# Patient Record
Sex: FEMALE | Race: WHITE | Employment: OTHER | URBAN - METROPOLITAN AREA
[De-identification: names, ages, dates, MRNs, and addresses within clinical notes are randomized per-mention and may not be internally consistent; named-entity substitution may affect disease eponyms.]

---

## 2023-02-09 ENCOUNTER — OUTSIDE SERVICES (OUTPATIENT)
Dept: PRIMARY CARE CLINIC | Age: 73
End: 2023-02-09

## 2023-02-09 DIAGNOSIS — F79 UNSPECIFIED INTELLECTUAL DISABILITIES: ICD-10-CM

## 2023-02-09 DIAGNOSIS — E78.2 MIXED HYPERLIPIDEMIA: ICD-10-CM

## 2023-02-09 DIAGNOSIS — E11.9 TYPE 2 DIABETES MELLITUS WITHOUT COMPLICATION, UNSPECIFIED WHETHER LONG TERM INSULIN USE (HCC): ICD-10-CM

## 2023-02-09 DIAGNOSIS — I10 ESSENTIAL HYPERTENSION, BENIGN: ICD-10-CM

## 2023-02-09 DIAGNOSIS — E53.9 VITAMIN B DEFICIENCY: ICD-10-CM

## 2023-02-09 DIAGNOSIS — G93.41 METABOLIC ENCEPHALOPATHY: Primary | ICD-10-CM

## 2023-02-09 ASSESSMENT — VISUAL ACUITY: OU: 1

## 2023-02-09 NOTE — PROGRESS NOTES
23  Gerrianne Collet : 1950 Sex: female  Age: 67 y.o. Tiara Russell is seen today to establish as a new patient at [facility] skilled nursing facility. Staff reports that she is at [facility] for rehabilitation, following metabolic encephalopathy. Patient's sister was at bedside. Staff notes that she has an intellectual disability and the thought process of a 2year-old. Staff notes that when they were assessing her that she had a large red area over her right breast.  There was 1 intact blister and 1 blistered area where the skin was removed. This appears to have happened, prior to coming to [facility] as some of the edges were dry. She did not report any pain, to the area. Staff denies any combative, disruptive or aggressive behaviors from her. Review of Systems   Unable to perform ROS: Other Intellectual disability    Physical Exam  Vitals and nursing note reviewed. Constitutional:       General: She is awake. She is not in acute distress. Appearance: Normal appearance. She is well-developed. She is obese. She is not ill-appearing, toxic-appearing or diaphoretic. HENT:      Head: Normocephalic and atraumatic. Right Ear: External ear normal. Decreased hearing noted. There is no impacted cerumen. Left Ear: External ear normal. Decreased hearing noted. There is no impacted cerumen. Nose: Nose normal. No congestion or rhinorrhea. Mouth/Throat:      Lips: Pink. No lesions. Mouth: Mucous membranes are moist.      Pharynx: Oropharynx is clear. No oropharyngeal exudate or posterior oropharyngeal erythema. Eyes:      General: Lids are normal. Vision grossly intact. Gaze aligned appropriately. No scleral icterus. Right eye: No discharge. Left eye: No discharge. Extraocular Movements: Extraocular movements intact. Conjunctiva/sclera: Conjunctivae normal.      Right eye: Right conjunctiva is not injected. Left eye: Left conjunctiva is not injected. Pupils: Pupils are equal, round, and reactive to light. Neck:      Thyroid: No thyromegaly. Vascular: No carotid bruit. Trachea: Trachea normal.   Cardiovascular:      Rate and Rhythm: Normal rate and regular rhythm. Pulses: Normal pulses. Heart sounds: S1 normal and S2 normal. Murmur heard. Systolic murmur is present with a grade of 1/6. No friction rub. No gallop. Pulmonary:      Effort: Pulmonary effort is normal. No tachypnea, accessory muscle usage or respiratory distress. Breath sounds: Normal breath sounds and air entry. No stridor. No wheezing, rhonchi or rales. Chest:      Chest wall: No tenderness. Abdominal:      General: Bowel sounds are normal. There is no distension. Palpations: Abdomen is soft. There is no mass. Tenderness: There is no abdominal tenderness. There is no right CVA tenderness, left CVA tenderness, guarding or rebound. Hernia: No hernia is present. Musculoskeletal:         General: No swelling, tenderness, deformity or signs of injury. Normal range of motion. Cervical back: Full passive range of motion without pain, normal range of motion and neck supple. No rigidity. No muscular tenderness. Right lower leg: No edema. Left lower leg: No edema. Lymphadenopathy:      Cervical: No cervical adenopathy. Skin:     General: Skin is warm and dry. Capillary Refill: Capillary refill takes less than 2 seconds. Coloration: Skin is not jaundiced or pale. Findings: No bruising, erythema, lesion or rash. Neurological:      General: No focal deficit present. Mental Status: She is alert. Mental status is at baseline. She is confused. Cranial Nerves: Cranial nerves 2-12 are intact. No cranial nerve deficit. Sensory: Sensation is intact. No sensory deficit. Motor: Weakness present.       Gait: Gait abnormal.      Deep Tendon Reflexes: Reflexes normal.   Psychiatric:         Attention and Perception: Attention normal.         Mood and Affect: Mood normal. Affect is flat. Behavior: Behavior normal. Behavior is cooperative. Assessment and Plan:  Pancho Lindsay was seen today for establish care. Diagnoses and all orders for this visit:    Metabolic encephalopathy    Essential hypertension, benign    Mixed hyperlipidemia    Type 2 diabetes mellitus without complication, unspecified whether long term insulin use ([facility] [address]    Vitamin B deficiency    Unspecified intellectual disabilities      Discussions/Education provided to patients during visit:  [] Discussed the importance to stop smoking. [x] Advised to monitor eating habits. [] Reviewed and discussed Imaging results. [] Reviewed and discussed Lab results. [x] Discussed the importance of drinking plenty of fluids. [] Cut down on Salt, Caffeine, and Sugar. [x] Continue Medications as Discussed. [x] Communicated with patient any concerns, to phone office. Orders written: Silvadene to burn, twice daily, until healed. Return in about 4 weeks (around 3/9/2023) for Review of chronic conditions.       Seen By:  SHRUTHI Connolly NP

## 2023-02-23 ENCOUNTER — OUTSIDE SERVICES (OUTPATIENT)
Dept: PRIMARY CARE CLINIC | Age: 73
End: 2023-02-23
Payer: MEDICAID

## 2023-02-23 DIAGNOSIS — E78.2 MIXED HYPERLIPIDEMIA: ICD-10-CM

## 2023-02-23 DIAGNOSIS — G93.41 METABOLIC ENCEPHALOPATHY: ICD-10-CM

## 2023-02-23 DIAGNOSIS — E53.9 VITAMIN B DEFICIENCY: ICD-10-CM

## 2023-02-23 DIAGNOSIS — L03.116 CELLULITIS OF LEFT LOWER EXTREMITY: Primary | ICD-10-CM

## 2023-02-23 DIAGNOSIS — E11.9 TYPE 2 DIABETES MELLITUS WITHOUT COMPLICATION, UNSPECIFIED WHETHER LONG TERM INSULIN USE (HCC): ICD-10-CM

## 2023-02-23 DIAGNOSIS — F79 UNSPECIFIED INTELLECTUAL DISABILITIES: ICD-10-CM

## 2023-02-23 DIAGNOSIS — I10 ESSENTIAL HYPERTENSION, BENIGN: ICD-10-CM

## 2023-02-23 PROCEDURE — 99309 SBSQ NF CARE MODERATE MDM 30: CPT | Performed by: NURSE PRACTITIONER

## 2023-02-23 NOTE — PROGRESS NOTES
23  Karuna Baker : 1950 Sex: female  Age: 67 y.o. Michael Sheffield is seen today, at the request of staff, for complaints of swelling and redness to her left lower extremity. Staff reports over the past few days they have noticed that her legs have began to swell and that she has redness to her leg. She denies any pain to her leg, but it is visibly reddened. Staff has also noted that her abdomen has been distended and more firm than normal.  She denies any other complaints today. Staff reports that they are not aware of any other issues with her. They deny any combative, disruptive or aggressive behaviors from her. Review of Systems  Unable to perform ROS: Other Intellectual disability    Physical Exam  Vitals and nursing note reviewed. Constitutional:       General: She is awake. She is not in acute distress. Appearance: Normal appearance. She is well-developed. She is obese. She is not ill-appearing, toxic-appearing or diaphoretic. HENT:      Head: Normocephalic and atraumatic. Right Ear: External ear normal. Decreased hearing noted. There is no impacted cerumen. Left Ear: External ear normal. Decreased hearing noted. There is no impacted cerumen. Nose: Nose normal. No congestion or rhinorrhea. Mouth/Throat:      Lips: Pink. No lesions. Mouth: Mucous membranes are moist.      Pharynx: Oropharynx is clear. No oropharyngeal exudate or posterior oropharyngeal erythema. Eyes:      General: Lids are normal. Vision grossly intact. Gaze aligned appropriately. No scleral icterus. Right eye: No discharge. Left eye: No discharge. Extraocular Movements: Extraocular movements intact. Conjunctiva/sclera: Conjunctivae normal.      Right eye: Right conjunctiva is not injected. Left eye: Left conjunctiva is not injected. Pupils: Pupils are equal, round, and reactive to light. Neck:      Thyroid: No thyromegaly.       Vascular: No carotid bruit.      Trachea: Trachea normal.   Cardiovascular:      Rate and Rhythm: Normal rate and regular rhythm. Pulses: Normal pulses. Heart sounds: S1 normal and S2 normal. Murmur heard. Systolic murmur is present with a grade of 1/6. No friction rub. No gallop. Pulmonary:      Effort: Pulmonary effort is normal. No tachypnea, accessory muscle usage or respiratory distress. Breath sounds: Normal breath sounds and air entry. No stridor. No wheezing, rhonchi or rales. Chest:      Chest wall: No tenderness. Abdominal:      General: Bowel sounds are normal.  Abdominal distention noted. Palpations: Abdomen is soft. There is no mass. Tenderness: There is no abdominal tenderness. There is no right CVA tenderness, left CVA tenderness, guarding or rebound. Hernia: No hernia is present. Musculoskeletal:         General: No swelling, tenderness, deformity or signs of injury. Normal range of motion. Cervical back: Full passive range of motion without pain, normal range of motion and neck supple. No rigidity. No muscular tenderness. Right lower leg: +1 pitting edema noted. Left lower leg: +1 pitting edema noted. Lymphadenopathy:      Cervical: No cervical adenopathy. Skin:     General: Skin is warm and dry. Capillary Refill: Capillary refill takes less than 2 seconds. Coloration: Skin is not jaundiced or pale. Findings: Erythema noted to the left lower extremity. No bruising, lesion or rash. Neurological:      General: No focal deficit present. Mental Status: She is alert. Mental status is at baseline. She is confused. Cranial Nerves: Cranial nerves 2-12 are intact. No cranial nerve deficit. Sensory: Sensation is intact. No sensory deficit. Motor: Weakness present.       Gait: Gait abnormal.      Deep Tendon Reflexes: Reflexes normal.   Psychiatric:         Attention and Perception: Attention normal.         Mood and Affect: Mood normal. Affect is flat. Behavior: Behavior normal. Behavior is cooperative. Assessment and Plan:  Ana Laura Fisher was seen today for cellulitis. Diagnoses and all orders for this visit:    Cellulitis of left lower extremity    Unspecified intellectual disabilities    Essential hypertension, benign    Type 2 diabetes mellitus without complication, unspecified whether long term insulin use ([facility])    Mixed hyperlipidemia    Vitamin B deficiency    Metabolic encephalopathy      Discussions/Education provided to patients during visit:  [] Discussed the importance to stop smoking. [x] Advised to monitor eating habits. [] Reviewed and discussed Imaging results. [] Reviewed and discussed Lab results. [x] Discussed the importance of drinking plenty of fluids. [x] Cut down on Salt, Caffeine, and Sugar. [x] Continue Medications as Discussed. [x] Communicated with patient any concerns, to phone office. Orders written: Keflex 500 mg p.o. 3 times daily x10 days, hydrochlorothiazide 25 mg p.o. daily, CBC, BNP and BMP in 1 week    Return in about 1 week (around 3/2/2023) for Reassess condition.       Seen By:  SHRUTHI Gregory NP

## 2024-07-22 ENCOUNTER — OUTSIDE SERVICES (OUTPATIENT)
Dept: FAMILY MEDICINE CLINIC | Age: 74
End: 2024-07-22
Payer: COMMERCIAL

## 2024-07-22 DIAGNOSIS — F79 INTELLECTUAL DISABILITY: ICD-10-CM

## 2024-07-22 DIAGNOSIS — K21.9 GASTROESOPHAGEAL REFLUX DISEASE WITHOUT ESOPHAGITIS: ICD-10-CM

## 2024-07-22 DIAGNOSIS — I10 PRIMARY HYPERTENSION: Primary | ICD-10-CM

## 2024-07-22 DIAGNOSIS — E11.9 TYPE 2 DIABETES MELLITUS WITHOUT COMPLICATION, WITHOUT LONG-TERM CURRENT USE OF INSULIN (HCC): ICD-10-CM

## 2024-07-22 DIAGNOSIS — E78.2 MIXED HYPERLIPIDEMIA: ICD-10-CM

## 2024-07-22 PROCEDURE — 99309 SBSQ NF CARE MODERATE MDM 30: CPT | Performed by: NURSE PRACTITIONER

## 2024-07-22 NOTE — PROGRESS NOTES
7/22/2024    She Singh  1950    This resident is being seen today for a skilled evaluation visit.  She is a resident who has long-term medical conditions including hypertension with hypertensive urgency, intellectual disabilities, diabetes mellitus, hyperlipidemia, essential hypertension, proteinuria, asthma, GERD, low back pain, chronic kidney disease.  She is a 73 y.o. female resident who is being seen today for skilled services which this resident recently presented to the hospital setting in the course of the past month with mental status changes and recommendations had been given for an MRI of the brain and was placed on gentle hydration with magnesium replacement and recommendations to monitor blood sugars accordingly.  She had been diagnosed with acute metabolic encephalopathy with associated hypertensive urgency.  Her blood pressures have been more managed and she appears to be doing well.  She does have underlying disabilities in terms of being mentally challenged but is able to communicate with staff.  She denies any complaints regarding pain, headaches or dizziness, sore throat, chest pain, nausea or vomiting, constipation or diarrhea, fever or chills, falls or syncopal events.            Medications:  Amlodipine 10 mg daily  Ipratropium albuterol every 6 hours as needed  Tylenol Extra Strength 1000 mg twice daily  Landen 128 ophthalmic ointment 5% to the left eye daily  Furosemide 20 mg daily  Omeprazole 40 mg daily  Magnesium oxide 400 mg daily  Atorvastatin 40 mg daily  Coreg 12.5 mg twice daily  Flovent  mcg per ACT 2 puffs twice daily  Loratadine 10 mg daily as needed  Glipizide 5 mg daily  Potassium chloride ER 20 mill equivalents daily  Mirtazapine 30 mg daily  Ibuprofen 400 mg 3 times daily  Aspirin 81 mg daily  B12 1000 mcg IM monthly  Diclofenac sodium gel to the bilateral knees 3 times daily.            Objective     Vital Signs: /64 pulse 79 respirations 18 temperature 98

## 2024-07-31 ENCOUNTER — OUTSIDE SERVICES (OUTPATIENT)
Dept: FAMILY MEDICINE CLINIC | Age: 74
End: 2024-07-31
Payer: COMMERCIAL

## 2024-07-31 DIAGNOSIS — M54.50 CHRONIC MIDLINE LOW BACK PAIN WITHOUT SCIATICA: ICD-10-CM

## 2024-07-31 DIAGNOSIS — I10 PRIMARY HYPERTENSION: ICD-10-CM

## 2024-07-31 DIAGNOSIS — E87.0 HYPERNATREMIA: Primary | ICD-10-CM

## 2024-07-31 DIAGNOSIS — N18.2 STAGE 2 CHRONIC KIDNEY DISEASE: ICD-10-CM

## 2024-07-31 DIAGNOSIS — G89.29 CHRONIC MIDLINE LOW BACK PAIN WITHOUT SCIATICA: ICD-10-CM

## 2024-07-31 DIAGNOSIS — E11.9 TYPE 2 DIABETES MELLITUS WITHOUT COMPLICATION, WITHOUT LONG-TERM CURRENT USE OF INSULIN (HCC): ICD-10-CM

## 2024-07-31 PROBLEM — G93.41 METABOLIC ENCEPHALOPATHY: Status: RESOLVED | Noted: 2023-02-09 | Resolved: 2024-07-31

## 2024-07-31 PROCEDURE — 99309 SBSQ NF CARE MODERATE MDM 30: CPT | Performed by: NURSE PRACTITIONER

## 2024-08-01 NOTE — PROGRESS NOTES
7/31/2024    She Singh  1950    This resident is being seen today for a skilled evaluation visit.  She is a resident who has long-term medical conditions including hypertension with hypertensive urgency, intellectual disabilities, diabetes mellitus, hyperlipidemia, essential hypertension, proteinuria, asthma, GERD, low back pain, chronic kidney disease.  She is a 73 y.o. female resident who is being seen today for a skilled evaluation visit with which this resident has been doing well and recovering from COVID and Staff indicates that her appetite has been improving.  She does have a history of ongoing back pain with which she does receive ibuprofen.  She has no complaints of pain at this time and furthermore states that she has no headaches or dizziness, sore throat, chest pain, coughing or shortness of breath, nausea or vomiting, constipation or diarrhea, fever or chills, falls or syncopal events.        Medications:  Amlodipine 10 mg daily  Ipratropium albuterol every 6 hours as needed  Tylenol Extra Strength 1000 mg twice daily  Landen 128 ophthalmic ointment 5% to the left eye daily  Furosemide 20 mg daily  Omeprazole 40 mg daily  Magnesium oxide 400 mg daily  Atorvastatin 40 mg daily  Coreg 12.5 mg twice daily  Flovent  mcg per ACT 2 puffs twice daily  Loratadine 10 mg daily as needed  Glipizide 5 mg daily  Potassium chloride ER 20 mill equivalents daily  Mirtazapine 30 mg daily  Ibuprofen 400 mg 3 times daily  Aspirin 81 mg daily  B12 1000 mcg IM monthly  Diclofenac sodium gel to the bilateral knees 3 times daily.            Objective     Vital Signs: /78 pulse 78 respirations 16 temperature 98.1 O2 97% weight 139.6 pounds          Physical examination:Skin is essentially warm and dry. HEENT right eye clouded but otherwise unremarkable. Neck is supple. Heart regular rate and rhythm. No rubs, gallops or murmurs noted.  Lungs are clear to auscultation.  No evidence of rhonchi, rales, or

## 2024-08-07 ENCOUNTER — OUTSIDE SERVICES (OUTPATIENT)
Dept: FAMILY MEDICINE CLINIC | Age: 74
End: 2024-08-07

## 2024-08-07 DIAGNOSIS — E11.9 TYPE 2 DIABETES MELLITUS WITHOUT COMPLICATION, WITHOUT LONG-TERM CURRENT USE OF INSULIN (HCC): ICD-10-CM

## 2024-08-07 DIAGNOSIS — E78.2 MIXED HYPERLIPIDEMIA: ICD-10-CM

## 2024-08-07 DIAGNOSIS — I10 PRIMARY HYPERTENSION: Primary | ICD-10-CM

## 2024-08-07 DIAGNOSIS — K21.9 GASTROESOPHAGEAL REFLUX DISEASE WITHOUT ESOPHAGITIS: ICD-10-CM

## 2024-08-07 DIAGNOSIS — E87.0 HYPERNATREMIA: ICD-10-CM

## 2024-08-07 NOTE — PROGRESS NOTES
8/7/2024    She Singh  1950    This resident is being seen today for a skilled evaluation visit.  She is a resident who has long-term medical conditions including hypertension with hypertensive urgency, intellectual disabilities, diabetes mellitus, hyperlipidemia, essential hypertension, proteinuria, asthma, GERD, low back pain, chronic kidney disease.  She is a 73 y.o. female resident who is being seen today for skilled therapy with which this resident indicates that she has had some degree of residual cough but does not have any chest pain or shortness of breath.  She furthermore denies a sore throat, nausea or vomiting, constipation or diarrhea, fever or chills, falls or syncopal events.  She does continue with the benefit of skilled therapy services in terms of physical therapy and Occupational Therapy.  Her appetite is stable with intakes 85% or above.        Medications:  Amlodipine 10 mg daily  Ipratropium albuterol every 6 hours as needed  Tylenol Extra Strength 1000 mg twice daily  Landen 128 ophthalmic ointment 5% to the left eye daily  Furosemide 20 mg daily  Omeprazole 40 mg daily  Magnesium oxide 400 mg daily  Atorvastatin 40 mg daily  Coreg 12.5 mg twice daily  Flovent  mcg per ACT 2 puffs twice daily  Loratadine 10 mg daily as needed  Glipizide 5 mg daily  Potassium chloride ER 20 mill equivalents daily  Mirtazapine 30 mg daily  Ibuprofen 400 mg 3 times daily  Aspirin 81 mg daily  B12 1000 mcg IM monthly  Diclofenac sodium gel to the bilateral knees 3 times daily.            Objective     Vital Signs: /77 pulse 70 respirations 16 temperature 98.1 O2 97% weight 143 pounds          Physical examination:Skin is essentially warm and dry. HEENT right eye clouded but otherwise unremarkable. Neck is supple. Heart regular rate and rhythm. No rubs, gallops or murmurs noted.  Lungs are clear to auscultation.  No evidence of rhonchi, rales, or wheezing. Abdomen is soft, supple and non-tender.

## 2024-08-12 ENCOUNTER — OUTSIDE SERVICES (OUTPATIENT)
Dept: FAMILY MEDICINE CLINIC | Age: 74
End: 2024-08-12

## 2024-08-12 DIAGNOSIS — E11.9 TYPE 2 DIABETES MELLITUS WITHOUT COMPLICATION, WITHOUT LONG-TERM CURRENT USE OF INSULIN (HCC): ICD-10-CM

## 2024-08-12 DIAGNOSIS — E78.2 MIXED HYPERLIPIDEMIA: ICD-10-CM

## 2024-08-12 DIAGNOSIS — R60.0 PERIPHERAL EDEMA: Primary | ICD-10-CM

## 2024-08-12 DIAGNOSIS — F79 INTELLECTUAL DISABILITY: ICD-10-CM

## 2024-08-12 DIAGNOSIS — N18.2 STAGE 2 CHRONIC KIDNEY DISEASE: ICD-10-CM

## 2024-08-12 NOTE — PROGRESS NOTES
8/12/2024    She Singh  1950    This resident is being seen today for a skilled evaluation visit.  She is a resident who has long-term medical conditions including hypertension with hypertensive urgency, intellectual disabilities, diabetes mellitus, hyperlipidemia, essential hypertension, proteinuria, asthma, GERD, low back pain, chronic kidney disease.  She is a 73 y.o. female resident who is being seen today for a skilled evaluation with which this resident does remain alert and oriented and responsive to verbal and tactile stimuli.  Staff does indicate that there have been some incidents where she has become combative with staff which is normally when attempting to obtain blood work.  The concern for this resident at this time is due to her underlying edema to her bilateral lower extremities.  She does specifically state that she would be willing to try the AKIN hose to help manage her edema.  She has no complaints regarding any pain, headaches or dizziness, sore throat, chest pain, coughing or shortness of breath, nausea or vomiting, constipation or diarrhea, fever or chills, falls or syncopal events.        Medications:  Amlodipine 10 mg daily  Ipratropium albuterol every 6 hours as needed  Tylenol Extra Strength 1000 mg twice daily  Landen 128 ophthalmic ointment 5% to the left eye daily  Furosemide 20 mg daily  Omeprazole 40 mg daily  Magnesium oxide 400 mg daily  Atorvastatin 40 mg daily  Coreg 12.5 mg twice daily  Flovent  mcg per ACT 2 puffs twice daily  Loratadine 10 mg daily as needed  Glipizide 5 mg daily  Potassium chloride ER 20 mill equivalents daily  Mirtazapine 30 mg daily  Ibuprofen 400 mg 3 times daily  Aspirin 81 mg daily  B12 1000 mcg IM monthly  Diclofenac sodium gel to the bilateral knees 3 times daily.            Objective     Vital Signs: /70 pulse 70 respirations 16 temperature 97.7 O2 96% weight 143 pounds          Physical examination:Skin is essentially warm and dry.

## 2024-08-22 ENCOUNTER — OUTSIDE SERVICES (OUTPATIENT)
Dept: FAMILY MEDICINE CLINIC | Age: 74
End: 2024-08-22
Payer: COMMERCIAL

## 2024-08-22 DIAGNOSIS — G89.29 CHRONIC MIDLINE LOW BACK PAIN WITHOUT SCIATICA: ICD-10-CM

## 2024-08-22 DIAGNOSIS — I10 PRIMARY HYPERTENSION: ICD-10-CM

## 2024-08-22 DIAGNOSIS — N18.2 STAGE 2 CHRONIC KIDNEY DISEASE: Primary | ICD-10-CM

## 2024-08-22 DIAGNOSIS — K21.9 GASTROESOPHAGEAL REFLUX DISEASE WITHOUT ESOPHAGITIS: ICD-10-CM

## 2024-08-22 DIAGNOSIS — R60.0 PERIPHERAL EDEMA: ICD-10-CM

## 2024-08-22 DIAGNOSIS — M54.50 CHRONIC MIDLINE LOW BACK PAIN WITHOUT SCIATICA: ICD-10-CM

## 2024-08-22 PROCEDURE — 99309 SBSQ NF CARE MODERATE MDM 30: CPT | Performed by: NURSE PRACTITIONER

## 2024-08-22 NOTE — PROGRESS NOTES
8/22/2024    She Singh  1950    This resident is being seen today for a skilled evaluation visit.  She is a resident who has long-term medical conditions including hypertension with hypertensive urgency, intellectual disabilities, diabetes mellitus, hyperlipidemia, essential hypertension, proteinuria, asthma, GERD, low back pain, chronic kidney disease.  She is a 73 y.o. female resident who is being seen today for skilled services with tentative discharge back to assisted living on 8/25/2024.  This is a resident who is alert responsive to verbal and tactile stimuli and offers no acute complaints.  She states that she has no pain, headaches or dizziness, sore throat, chest pain, nausea or vomiting, constipation or diarrhea, fever or chills, falls or syncopal events.        Medications:  Amlodipine 10 mg daily  Ipratropium albuterol every 6 hours as needed  Tylenol Extra Strength 1000 mg twice daily  Landen 128 ophthalmic ointment 5% to the left eye daily  Furosemide 20 mg daily  Omeprazole 40 mg daily  Magnesium oxide 400 mg daily  Atorvastatin 40 mg daily  Coreg 12.5 mg twice daily  Flovent  mcg per ACT 2 puffs twice daily  Loratadine 10 mg daily as needed  Glipizide 5 mg daily  Potassium chloride ER 20 mill equivalents daily  Mirtazapine 30 mg daily  Ibuprofen 400 mg 3 times daily  Aspirin 81 mg daily  B12 1000 mcg IM monthly  Diclofenac sodium gel to the bilateral knees 3 times daily.            Objective     Vital Signs: /60 pulse 64 respirations 18 temperature 98.2 O2 95% weight 143 pounds          Physical examination:Skin is essentially warm and dry. HEENT right eye clouded but otherwise unremarkable. Neck is supple. Heart regular rate and rhythm. No rubs, gallops or murmurs noted.  Lungs are clear to auscultation.  No evidence of rhonchi, rales, or wheezing. Abdomen is soft, supple and non-tender.  Bowel sounds are noted x4 quadrants. No rigidity, guarding or rebound tenderness.  Negative

## 2024-08-26 ENCOUNTER — OUTSIDE SERVICES (OUTPATIENT)
Dept: FAMILY MEDICINE CLINIC | Age: 74
End: 2024-08-26
Payer: COMMERCIAL

## 2024-08-26 DIAGNOSIS — E78.2 MIXED HYPERLIPIDEMIA: ICD-10-CM

## 2024-08-26 DIAGNOSIS — K21.9 GASTROESOPHAGEAL REFLUX DISEASE WITHOUT ESOPHAGITIS: ICD-10-CM

## 2024-08-26 DIAGNOSIS — I10 PRIMARY HYPERTENSION: Primary | ICD-10-CM

## 2024-08-26 DIAGNOSIS — F79 INTELLECTUAL DISABILITY: ICD-10-CM

## 2024-08-26 DIAGNOSIS — N18.2 STAGE 2 CHRONIC KIDNEY DISEASE: ICD-10-CM

## 2024-08-26 PROCEDURE — 99309 SBSQ NF CARE MODERATE MDM 30: CPT | Performed by: NURSE PRACTITIONER

## 2024-08-26 NOTE — PROGRESS NOTES
8/26/2024    She Singh  1950    This resident is being seen today for a skilled evaluation visit.  She is a resident who has long-term medical conditions including hypertension with hypertensive urgency, intellectual disabilities, diabetes mellitus, hyperlipidemia, essential hypertension, proteinuria, asthma, GERD, low back pain, chronic kidney disease.  She is a 73 y.o. female resident who is being seen today for skilled services with which this resident is seen in conjunction with speech therapy and has a dry cough from time to time.  There have been no major concerns otherwise.  She seems to be progressing well in terms of her physical therapy.  The plan is for her to be discharged back to [facility], with which she was scheduled for this week.  She denies any complaints of pain, headaches or dizziness, sore throat, chest pain, nausea or vomiting, constipation or diarrhea, fever or chills, falls or syncopal events.      Medications:  Amlodipine 10 mg daily  Ipratropium albuterol every 6 hours as needed  Tylenol Extra Strength 1000 mg twice daily  Landen 128 ophthalmic ointment 5% to the left eye daily  Furosemide 20 mg daily  Omeprazole 40 mg daily  Magnesium oxide 400 mg daily  Atorvastatin 40 mg daily  Coreg 12.5 mg twice daily  Flovent  mcg per ACT 2 puffs twice daily  Loratadine 10 mg daily as needed  Glipizide 5 mg daily  Potassium chloride ER 20 mill equivalents daily  Mirtazapine 30 mg daily  Ibuprofen 400 mg 3 times daily  Aspirin 81 mg daily  B12 1000 mcg IM monthly  Diclofenac sodium gel to the bilateral knees 3 times daily.            Objective     Vital Signs: /70 pulse 83 respirations 18 temperature 97.9 O2 95% weight 143 pounds          Physical examination:Skin is essentially warm and dry. HEENT right eye clouded but otherwise unremarkable. Neck is supple. Heart regular rate and rhythm. No rubs, gallops or murmurs noted.  Lungs are clear to auscultation.  No evidence of rhonchi,

## 2024-08-27 ENCOUNTER — TELEPHONE (OUTPATIENT)
Dept: FAMILY MEDICINE CLINIC | Age: 74
End: 2024-08-27

## 2024-08-27 RX ORDER — SULFAMETHOXAZOLE/TRIMETHOPRIM 800-160 MG
1 TABLET ORAL 2 TIMES DAILY
Qty: 10 TABLET | Refills: 0 | Status: CANCELLED | OUTPATIENT
Start: 2024-08-27 | End: 2024-09-01